# Patient Record
Sex: FEMALE | URBAN - NONMETROPOLITAN AREA
[De-identification: names, ages, dates, MRNs, and addresses within clinical notes are randomized per-mention and may not be internally consistent; named-entity substitution may affect disease eponyms.]

---

## 2017-07-26 ENCOUNTER — DOCTOR'S OFFICE (OUTPATIENT)
Dept: URBAN - NONMETROPOLITAN AREA CLINIC 1 | Facility: CLINIC | Age: 57
Setting detail: OPHTHALMOLOGY
End: 2017-07-26
Payer: COMMERCIAL

## 2017-07-26 DIAGNOSIS — H52.4: ICD-10-CM

## 2017-07-26 PROCEDURE — 92015 DETERMINE REFRACTIVE STATE: CPT | Performed by: OPTOMETRIST

## 2017-07-27 ENCOUNTER — DOCTOR'S OFFICE (OUTPATIENT)
Dept: URBAN - NONMETROPOLITAN AREA CLINIC 1 | Facility: CLINIC | Age: 57
Setting detail: OPHTHALMOLOGY
End: 2017-07-27
Payer: COMMERCIAL

## 2017-07-27 DIAGNOSIS — H53.021: ICD-10-CM

## 2017-07-27 DIAGNOSIS — H52.03: ICD-10-CM

## 2017-07-27 DIAGNOSIS — H52.4: ICD-10-CM

## 2017-07-27 DIAGNOSIS — H25.13: ICD-10-CM

## 2017-07-27 PROCEDURE — 92004 COMPRE OPH EXAM NEW PT 1/>: CPT | Performed by: OPTOMETRIST

## 2017-07-27 PROCEDURE — 92015 DETERMINE REFRACTIVE STATE: CPT | Performed by: OPTOMETRIST

## 2017-07-27 ASSESSMENT — REFRACTION_OUTSIDERX
OS_CYLINDER: -0.75
OS_AXIS: 090
OD_CYLINDER: -0.75
OS_VA2: 20/25
OS_SPHERE: +0.50
OD_AXIS: 175
OD_ADD: +2.50
OD_VA3: 20/
OS_ADD: +2.50
OD_VA2: 20/30
OD_SPHERE: +1.00
OD_VA1: 20/30NI
OU_VA: 20/25
OS_VA3: 20/
OS_VA1: 20/25NI

## 2017-07-27 ASSESSMENT — REFRACTION_MANIFEST
OD_VA2: 20/
OD_VA1: 20/
OS_VA2: 20/
OS_VA2: 20/
OD_VA3: 20/
OD_VA2: 20/
OS_VA1: 20/
OS_VA3: 20/
OU_VA: 20/
OU_VA: 20/
OD_VA3: 20/
OS_VA1: 20/
OD_VA1: 20/
OS_VA3: 20/

## 2017-07-27 ASSESSMENT — REFRACTION_AUTOREFRACTION
OD_CYLINDER: -3.00
OS_CYLINDER: -3.25
OD_AXIS: 173
OD_SPHERE: +1.00
OS_SPHERE: +0.50
OS_AXIS: 096

## 2017-07-27 ASSESSMENT — CONFRONTATIONAL VISUAL FIELD TEST (CVF)
OD_FINDINGS: FULL
OS_FINDINGS: FULL

## 2017-07-27 ASSESSMENT — REFRACTION_CURRENTRX
OS_OVR_VA: 20/
OD_OVR_VA: 20/
OD_OVR_VA: 20/
OS_OVR_VA: 20/
OS_OVR_VA: 20/
OD_OVR_VA: 20/

## 2017-07-27 ASSESSMENT — SPHEQUIV_DERIVED
OS_SPHEQUIV: -1.125
OD_SPHEQUIV: -0.5

## 2017-07-27 ASSESSMENT — VISUAL ACUITY
OS_BCVA: 20/50+2
OD_BCVA: 20/30+1

## 2020-11-27 ENCOUNTER — APPOINTMENT (EMERGENCY)
Dept: RADIOLOGY | Facility: HOSPITAL | Age: 60
End: 2020-11-27
Payer: MEDICARE

## 2020-11-27 ENCOUNTER — HOSPITAL ENCOUNTER (EMERGENCY)
Facility: HOSPITAL | Age: 60
Discharge: HOME | End: 2020-11-27
Attending: EMERGENCY MEDICINE
Payer: MEDICARE

## 2020-11-27 ENCOUNTER — APPOINTMENT (EMERGENCY)
Dept: CT IMAGING | Facility: HOSPITAL | Age: 60
End: 2020-11-27
Payer: MEDICARE

## 2020-11-27 VITALS
TEMPERATURE: 98.2 F | DIASTOLIC BLOOD PRESSURE: 68 MMHG | BODY MASS INDEX: 27.6 KG/M2 | SYSTOLIC BLOOD PRESSURE: 143 MMHG | RESPIRATION RATE: 20 BRPM | OXYGEN SATURATION: 95 % | HEIGHT: 66 IN | HEART RATE: 87 BPM | WEIGHT: 171.74 LBS

## 2020-11-27 DIAGNOSIS — J44.1 COPD EXACERBATION (HCC): ICD-10-CM

## 2020-11-27 DIAGNOSIS — K86.89 PANCREATIC MASS: Primary | ICD-10-CM

## 2020-11-27 LAB
ALBUMIN SERPL BCP-MCNC: 3.1 G/DL (ref 3.5–5)
ALP SERPL-CCNC: 99 U/L (ref 46–116)
ALT SERPL W P-5'-P-CCNC: 17 U/L (ref 12–78)
ANION GAP SERPL CALCULATED.3IONS-SCNC: 5 MMOL/L (ref 4–13)
AST SERPL W P-5'-P-CCNC: 12 U/L (ref 5–45)
BASOPHILS # BLD AUTO: 0.09 THOUSANDS/ΜL (ref 0–0.1)
BASOPHILS NFR BLD AUTO: 1 % (ref 0–1)
BILIRUB SERPL-MCNC: 0.7 MG/DL (ref 0.2–1)
BUN SERPL-MCNC: 5 MG/DL (ref 5–25)
CALCIUM ALBUM COR SERPL-MCNC: 9.8 MG/DL (ref 8.3–10.1)
CALCIUM SERPL-MCNC: 9.1 MG/DL (ref 8.3–10.1)
CHLORIDE SERPL-SCNC: 104 MMOL/L (ref 100–108)
CO2 SERPL-SCNC: 31 MMOL/L (ref 21–32)
CREAT SERPL-MCNC: 0.76 MG/DL (ref 0.6–1.3)
D DIMER PPP FEU-MCNC: 0.73 UG/ML FEU
EOSINOPHIL # BLD AUTO: 0.11 THOUSAND/ΜL (ref 0–0.61)
EOSINOPHIL NFR BLD AUTO: 1 % (ref 0–6)
ERYTHROCYTE [DISTWIDTH] IN BLOOD BY AUTOMATED COUNT: 13 % (ref 11.6–15.1)
GFR SERPL CREATININE-BSD FRML MDRD: 86 ML/MIN/1.73SQ M
GLUCOSE SERPL-MCNC: 101 MG/DL (ref 65–140)
HCT VFR BLD AUTO: 47.3 % (ref 34.8–46.1)
HGB BLD-MCNC: 15.9 G/DL (ref 11.5–15.4)
IMM GRANULOCYTES # BLD AUTO: 0.03 THOUSAND/UL (ref 0–0.2)
IMM GRANULOCYTES NFR BLD AUTO: 0 % (ref 0–2)
LYMPHOCYTES # BLD AUTO: 1.93 THOUSANDS/ΜL (ref 0.6–4.47)
LYMPHOCYTES NFR BLD AUTO: 19 % (ref 14–44)
MCH RBC QN AUTO: 30.7 PG (ref 26.8–34.3)
MCHC RBC AUTO-ENTMCNC: 33.6 G/DL (ref 31.4–37.4)
MCV RBC AUTO: 91 FL (ref 82–98)
MONOCYTES # BLD AUTO: 0.74 THOUSAND/ΜL (ref 0.17–1.22)
MONOCYTES NFR BLD AUTO: 7 % (ref 4–12)
NEUTROPHILS # BLD AUTO: 7.22 THOUSANDS/ΜL (ref 1.85–7.62)
NEUTS SEG NFR BLD AUTO: 72 % (ref 43–75)
NRBC BLD AUTO-RTO: 0 /100 WBCS
NT-PROBNP SERPL-MCNC: 339 PG/ML
PLATELET # BLD AUTO: 310 THOUSANDS/UL (ref 149–390)
PMV BLD AUTO: 10 FL (ref 8.9–12.7)
POTASSIUM SERPL-SCNC: 3 MMOL/L (ref 3.5–5.3)
PROT SERPL-MCNC: 7 G/DL (ref 6.4–8.2)
RBC # BLD AUTO: 5.18 MILLION/UL (ref 3.81–5.12)
SODIUM SERPL-SCNC: 140 MMOL/L (ref 136–145)
TROPONIN I SERPL-MCNC: <0.02 NG/ML
WBC # BLD AUTO: 10.12 THOUSAND/UL (ref 4.31–10.16)

## 2020-11-27 PROCEDURE — 85025 COMPLETE CBC W/AUTO DIFF WBC: CPT | Performed by: EMERGENCY MEDICINE

## 2020-11-27 PROCEDURE — 83880 ASSAY OF NATRIURETIC PEPTIDE: CPT | Performed by: EMERGENCY MEDICINE

## 2020-11-27 PROCEDURE — 85379 FIBRIN DEGRADATION QUANT: CPT | Performed by: EMERGENCY MEDICINE

## 2020-11-27 PROCEDURE — 71045 X-RAY EXAM CHEST 1 VIEW: CPT

## 2020-11-27 PROCEDURE — 93005 ELECTROCARDIOGRAM TRACING: CPT

## 2020-11-27 PROCEDURE — 36415 COLL VENOUS BLD VENIPUNCTURE: CPT | Performed by: EMERGENCY MEDICINE

## 2020-11-27 PROCEDURE — 94640 AIRWAY INHALATION TREATMENT: CPT

## 2020-11-27 PROCEDURE — 99285 EMERGENCY DEPT VISIT HI MDM: CPT

## 2020-11-27 PROCEDURE — 80053 COMPREHEN METABOLIC PANEL: CPT | Performed by: EMERGENCY MEDICINE

## 2020-11-27 PROCEDURE — G1004 CDSM NDSC: HCPCS

## 2020-11-27 PROCEDURE — 99285 EMERGENCY DEPT VISIT HI MDM: CPT | Performed by: EMERGENCY MEDICINE

## 2020-11-27 PROCEDURE — 71275 CT ANGIOGRAPHY CHEST: CPT

## 2020-11-27 PROCEDURE — 84484 ASSAY OF TROPONIN QUANT: CPT | Performed by: EMERGENCY MEDICINE

## 2020-11-27 RX ORDER — ALBUTEROL SULFATE 2.5 MG/3ML
2.5 SOLUTION RESPIRATORY (INHALATION) EVERY 6 HOURS PRN
Qty: 75 ML | Refills: 0 | Status: SHIPPED | OUTPATIENT
Start: 2020-11-27

## 2020-11-27 RX ORDER — ALBUTEROL SULFATE 2.5 MG/3ML
2.5 SOLUTION RESPIRATORY (INHALATION) ONCE
Status: COMPLETED | OUTPATIENT
Start: 2020-11-27 | End: 2020-11-27

## 2020-11-27 RX ADMIN — ALBUTEROL SULFATE 2.5 MG: 2.5 SOLUTION RESPIRATORY (INHALATION) at 15:22

## 2020-11-27 RX ADMIN — IOHEXOL 85 ML: 350 INJECTION, SOLUTION INTRAVENOUS at 16:13

## 2020-11-29 LAB
ATRIAL RATE: 88 BPM
P AXIS: 77 DEGREES
PR INTERVAL: 150 MS
QRS AXIS: 75 DEGREES
QRSD INTERVAL: 94 MS
QT INTERVAL: 378 MS
QTC INTERVAL: 457 MS
T WAVE AXIS: 82 DEGREES
VENTRICULAR RATE: 88 BPM

## 2020-11-29 PROCEDURE — 93010 ELECTROCARDIOGRAM REPORT: CPT | Performed by: INTERNAL MEDICINE

## 2020-12-01 ENCOUNTER — HOSPITAL ENCOUNTER (OUTPATIENT)
Dept: CT IMAGING | Facility: HOSPITAL | Age: 60
Discharge: HOME | End: 2020-12-01
Attending: EMERGENCY MEDICINE
Payer: MEDICARE

## 2020-12-01 DIAGNOSIS — K86.89 PANCREATIC MASS: ICD-10-CM

## 2020-12-01 PROCEDURE — 74177 CT ABD & PELVIS W/CONTRAST: CPT

## 2020-12-01 PROCEDURE — G1004 CDSM NDSC: HCPCS

## 2020-12-01 RX ADMIN — IOHEXOL 100 ML: 350 INJECTION, SOLUTION INTRAVENOUS at 11:02

## 2023-06-09 ENCOUNTER — DOCTOR'S OFFICE (OUTPATIENT)
Dept: URBAN - NONMETROPOLITAN AREA CLINIC 1 | Facility: CLINIC | Age: 63
Setting detail: OPHTHALMOLOGY
End: 2023-06-09
Payer: MEDICARE

## 2023-06-09 VITALS — HEIGHT: 55 IN

## 2023-06-09 DIAGNOSIS — H43.812: ICD-10-CM

## 2023-06-09 DIAGNOSIS — H35.3132: ICD-10-CM

## 2023-06-09 DIAGNOSIS — H04.123: ICD-10-CM

## 2023-06-09 DIAGNOSIS — H25.13: ICD-10-CM

## 2023-06-09 PROCEDURE — 92004 COMPRE OPH EXAM NEW PT 1/>: CPT | Performed by: OPHTHALMOLOGY

## 2023-06-09 PROCEDURE — 92134 CPTRZ OPH DX IMG PST SGM RTA: CPT | Performed by: OPHTHALMOLOGY

## 2023-06-09 ASSESSMENT — REFRACTION_MANIFEST
OS_ADD: +2.50
OD_ADD: +2.50
OD_CYLINDER: -0.75
OS_VA2: 20/25
OU_VA: 20/25
OD_VA1: 20/30NI
OD_SPHERE: +1.00
OS_SPHERE: +0.50
OD_VA2: 20/30
OS_AXIS: 090
OD_AXIS: 175
OS_CYLINDER: -0.75
OS_VA1: 20/25NI

## 2023-06-09 ASSESSMENT — REFRACTION_AUTOREFRACTION
OD_SPHERE: +0.25
OD_CYLINDER: -0.50
OS_SPHERE: +1.25
OD_AXIS: 051
OS_AXIS: 071
OS_CYLINDER: -1.00

## 2023-06-09 ASSESSMENT — AXIALLENGTH_DERIVED
OS_AL: 22.7778
OD_AL: 22.9662
OD_AL: 22.7377
OS_AL: 23.0071

## 2023-06-09 ASSESSMENT — VISUAL ACUITY
OD_BCVA: 20/50-2
OS_BCVA: 20/40-2

## 2023-06-09 ASSESSMENT — KERATOMETRY
OS_AXISANGLE_DEGREES: 122
OD_AXISANGLE_DEGREES: 081
OS_K1POWER_DIOPTERS: 44.75
OD_K2POWER_DIOPTERS: 45.50
OD_K1POWER_DIOPTERS: 45.00
OS_K2POWER_DIOPTERS: 45.25

## 2023-06-09 ASSESSMENT — SPHEQUIV_DERIVED
OD_SPHEQUIV: 0
OS_SPHEQUIV: 0.75
OD_SPHEQUIV: 0.625
OS_SPHEQUIV: 0.125

## 2023-06-09 ASSESSMENT — DRY EYES - PHYSICIAN NOTES
OD_GENERALCOMMENTS: 2+ PEE
OS_GENERALCOMMENTS: 2+ PEE

## 2023-06-09 ASSESSMENT — REFRACTION_CURRENTRX
OD_OVR_VA: 20/
OS_OVR_VA: 20/

## 2023-06-09 ASSESSMENT — CONFRONTATIONAL VISUAL FIELD TEST (CVF)
OS_FINDINGS: FULL
OD_FINDINGS: FULL

## 2023-06-19 ENCOUNTER — DOCTOR'S OFFICE (OUTPATIENT)
Dept: URBAN - NONMETROPOLITAN AREA CLINIC 1 | Facility: CLINIC | Age: 63
Setting detail: OPHTHALMOLOGY
End: 2023-06-19
Payer: MEDICARE

## 2023-06-19 DIAGNOSIS — H04.123: ICD-10-CM

## 2023-06-19 DIAGNOSIS — H35.3132: ICD-10-CM

## 2023-06-19 DIAGNOSIS — H43.812: ICD-10-CM

## 2023-06-19 DIAGNOSIS — H25.13: ICD-10-CM

## 2023-06-19 PROCEDURE — 99214 OFFICE O/P EST MOD 30 MIN: CPT | Performed by: OPHTHALMOLOGY

## 2023-06-19 ASSESSMENT — DRY EYES - PHYSICIAN NOTES
OS_GENERALCOMMENTS: 2+ PEE
OD_GENERALCOMMENTS: 2+ PEE

## 2023-06-19 ASSESSMENT — REFRACTION_MANIFEST
OD_VA2: 20/30
OS_VA2: 20/25
OD_VA1: 20/30NI
OS_SPHERE: +0.50
OD_SPHERE: +1.00
OU_VA: 20/25
OS_CYLINDER: -0.75
OD_CYLINDER: -0.75
OS_AXIS: 090
OS_ADD: +2.50
OS_VA1: 20/25NI
OD_ADD: +2.50
OD_AXIS: 175

## 2023-06-19 ASSESSMENT — REFRACTION_AUTOREFRACTION
OD_AXIS: 051
OS_SPHERE: +1.25
OS_AXIS: 071
OD_CYLINDER: -0.50
OS_CYLINDER: -1.00
OD_SPHERE: +0.25

## 2023-06-19 ASSESSMENT — REFRACTION_CURRENTRX
OD_OVR_VA: 20/
OS_OVR_VA: 20/

## 2023-06-19 ASSESSMENT — CONFRONTATIONAL VISUAL FIELD TEST (CVF)
OS_FINDINGS: FULL
OD_FINDINGS: FULL

## 2023-06-19 ASSESSMENT — SPHEQUIV_DERIVED
OS_SPHEQUIV: 0.75
OD_SPHEQUIV: 0.625
OS_SPHEQUIV: 0.125
OD_SPHEQUIV: 0

## 2023-06-19 ASSESSMENT — VISUAL ACUITY
OD_BCVA: 20/50-2
OS_BCVA: 20/50-2

## 2023-06-19 ASSESSMENT — KERATOMETRY
OS_K1POWER_DIOPTERS: 44.75
OS_AXISANGLE_DEGREES: 122
OD_AXISANGLE_DEGREES: 081
OD_K2POWER_DIOPTERS: 45.50
OD_K1POWER_DIOPTERS: 45.00
OS_K2POWER_DIOPTERS: 45.25

## 2023-06-19 ASSESSMENT — AXIALLENGTH_DERIVED
OD_AL: 22.7377
OS_AL: 23.0071
OS_AL: 22.7778
OD_AL: 22.9662

## 2023-07-13 ENCOUNTER — DOCTOR'S OFFICE (OUTPATIENT)
Dept: URBAN - NONMETROPOLITAN AREA CLINIC 1 | Facility: CLINIC | Age: 63
Setting detail: OPHTHALMOLOGY
End: 2023-07-13
Payer: MEDICARE

## 2023-07-13 DIAGNOSIS — H25.12: ICD-10-CM

## 2023-07-13 PROCEDURE — 92136 OPHTHALMIC BIOMETRY: CPT | Performed by: OPHTHALMOLOGY

## 2023-07-17 ENCOUNTER — DOCTOR'S OFFICE (OUTPATIENT)
Dept: URBAN - NONMETROPOLITAN AREA CLINIC 1 | Facility: CLINIC | Age: 63
Setting detail: OPHTHALMOLOGY
End: 2023-07-17
Payer: MEDICARE

## 2023-07-17 DIAGNOSIS — H25.13: ICD-10-CM

## 2023-07-17 DIAGNOSIS — H35.3132: ICD-10-CM

## 2023-07-17 DIAGNOSIS — H43.812: ICD-10-CM

## 2023-07-17 DIAGNOSIS — H04.123: ICD-10-CM

## 2023-07-17 PROCEDURE — 92235 FLUORESCEIN ANGRPH MLTIFRAME: CPT | Performed by: OPHTHALMOLOGY

## 2023-07-17 PROCEDURE — 99213 OFFICE O/P EST LOW 20 MIN: CPT | Performed by: OPHTHALMOLOGY

## 2023-07-17 PROCEDURE — 92250 FUNDUS PHOTOGRAPHY W/I&R: CPT | Performed by: OPHTHALMOLOGY

## 2023-07-17 ASSESSMENT — DRY EYES - PHYSICIAN NOTES
OS_GENERALCOMMENTS: 2+ PEE
OD_GENERALCOMMENTS: 2+ PEE

## 2023-07-17 ASSESSMENT — CONFRONTATIONAL VISUAL FIELD TEST (CVF): OD_FINDINGS: FULL

## 2023-07-18 ASSESSMENT — REFRACTION_MANIFEST
OS_ADD: +2.50
OD_ADD: +2.50
OU_VA: 20/25
OS_VA2: 20/25
OS_SPHERE: +0.50
OS_AXIS: 090
OD_VA1: 20/30NI
OS_CYLINDER: -0.75
OD_CYLINDER: -0.75
OS_VA1: 20/25NI
OD_SPHERE: +1.00
OD_AXIS: 175
OD_VA2: 20/30

## 2023-07-18 ASSESSMENT — REFRACTION_AUTOREFRACTION
OD_CYLINDER: -0.50
OD_AXIS: 051
OS_AXIS: 071
OS_CYLINDER: -1.00
OD_SPHERE: +0.25
OS_SPHERE: +1.25

## 2023-07-18 ASSESSMENT — KERATOMETRY
OD_K2POWER_DIOPTERS: 45.50
OD_AXISANGLE_DEGREES: 081
OD_K1POWER_DIOPTERS: 45.00
OS_K2POWER_DIOPTERS: 45.25
OS_AXISANGLE_DEGREES: 122
OS_K1POWER_DIOPTERS: 44.75

## 2023-07-18 ASSESSMENT — AXIALLENGTH_DERIVED
OD_AL: 22.7377
OS_AL: 23.0071
OD_AL: 22.9662
OS_AL: 22.7778

## 2023-07-18 ASSESSMENT — VISUAL ACUITY
OD_BCVA: 20/40+2
OS_BCVA: 20/50+2

## 2023-07-18 ASSESSMENT — SPHEQUIV_DERIVED
OD_SPHEQUIV: 0.625
OS_SPHEQUIV: 0.125
OD_SPHEQUIV: 0
OS_SPHEQUIV: 0.75

## 2023-07-18 ASSESSMENT — REFRACTION_CURRENTRX
OD_OVR_VA: 20/
OS_OVR_VA: 20/

## 2023-07-24 ENCOUNTER — AMBUL SURGICAL CARE (OUTPATIENT)
Dept: URBAN - NONMETROPOLITAN AREA SURGERY 1 | Facility: SURGERY | Age: 63
Setting detail: OPHTHALMOLOGY
End: 2023-07-24
Payer: MEDICARE

## 2023-07-24 DIAGNOSIS — H25.12: ICD-10-CM

## 2023-07-24 PROCEDURE — G8907 PT DOC NO EVENTS ON DISCHARG: HCPCS | Performed by: OPHTHALMOLOGY

## 2023-07-24 PROCEDURE — 66984 XCAPSL CTRC RMVL W/O ECP: CPT | Performed by: OPHTHALMOLOGY

## 2023-07-24 PROCEDURE — G8918 PT W/O PREOP ORDER IV AB PRO: HCPCS | Performed by: OPHTHALMOLOGY

## 2023-07-25 ENCOUNTER — DOCTOR'S OFFICE (OUTPATIENT)
Dept: URBAN - NONMETROPOLITAN AREA CLINIC 1 | Facility: CLINIC | Age: 63
Setting detail: OPHTHALMOLOGY
End: 2023-07-25
Payer: MEDICARE

## 2023-07-25 ENCOUNTER — RX ONLY (RX ONLY)
Age: 63
End: 2023-07-25

## 2023-07-25 DIAGNOSIS — H25.11: ICD-10-CM

## 2023-07-25 DIAGNOSIS — Z96.1: ICD-10-CM

## 2023-07-25 PROCEDURE — 92136 OPHTHALMIC BIOMETRY: CPT | Performed by: OPHTHALMOLOGY

## 2023-07-25 PROCEDURE — 99024 POSTOP FOLLOW-UP VISIT: CPT | Performed by: OPHTHALMOLOGY

## 2023-07-25 ASSESSMENT — AXIALLENGTH_DERIVED
OD_AL: 22.8233
OS_AL: 23.0948
OD_AL: 22.9609
OS_AL: 22.9096

## 2023-07-25 ASSESSMENT — REFRACTION_MANIFEST
OS_AXIS: 090
OD_CYLINDER: -0.75
OS_VA2: 20/25
OD_VA2: 20/30
OS_SPHERE: +0.50
OD_VA1: 20/30NI
OD_ADD: +2.50
OD_SPHERE: +1.00
OD_AXIS: 175
OS_ADD: +2.50
OU_VA: 20/25
OS_VA1: 20/25NI
OS_CYLINDER: -0.75

## 2023-07-25 ASSESSMENT — SPHEQUIV_DERIVED
OS_SPHEQUIV: 0.625
OS_SPHEQUIV: 0.125
OD_SPHEQUIV: 0.625
OD_SPHEQUIV: 0.25

## 2023-07-25 ASSESSMENT — REFRACTION_AUTOREFRACTION
OS_CYLINDER: -0.75
OD_CYLINDER: 0.00
OS_SPHERE: +1.00
OD_SPHERE: +0.25
OS_AXIS: 059

## 2023-07-25 ASSESSMENT — KERATOMETRY
OS_AXISANGLE_DEGREES: 123
OS_K1POWER_DIOPTERS: 44.50
OD_AXISANGLE_DEGREES: 078
OS_K2POWER_DIOPTERS: 45.00
OD_K1POWER_DIOPTERS: 44.50
OD_K2POWER_DIOPTERS: 45.50

## 2023-07-25 ASSESSMENT — REFRACTION_CURRENTRX
OS_OVR_VA: 20/
OD_OVR_VA: 20/

## 2023-07-25 ASSESSMENT — VISUAL ACUITY
OD_BCVA: 20/50-2
OS_BCVA: 20/40-2

## 2023-07-25 ASSESSMENT — DRY EYES - PHYSICIAN NOTES: OD_GENERALCOMMENTS: 2+ PEE

## 2023-08-01 ENCOUNTER — DOCTOR'S OFFICE (OUTPATIENT)
Dept: URBAN - NONMETROPOLITAN AREA CLINIC 1 | Facility: CLINIC | Age: 63
Setting detail: OPHTHALMOLOGY
End: 2023-08-01
Payer: MEDICARE

## 2023-08-01 DIAGNOSIS — Z96.1: ICD-10-CM

## 2023-08-01 DIAGNOSIS — H25.11: ICD-10-CM

## 2023-08-01 PROCEDURE — 99024 POSTOP FOLLOW-UP VISIT: CPT | Performed by: OPHTHALMOLOGY

## 2023-08-01 ASSESSMENT — REFRACTION_MANIFEST
OS_VA1: 20/25NI
OD_VA2: 20/30
OD_AXIS: 175
OS_CYLINDER: -0.75
OD_SPHERE: +1.00
OS_AXIS: 090
OU_VA: 20/25
OD_CYLINDER: -0.75
OD_VA1: 20/30NI
OS_ADD: +2.50
OS_SPHERE: +0.50
OS_VA2: 20/25
OD_ADD: +2.50

## 2023-08-01 ASSESSMENT — KERATOMETRY
OD_K2POWER_DIOPTERS: 45.00
OS_K1POWER_DIOPTERS: 45.00
OD_K1POWER_DIOPTERS: 44.75
OS_AXISANGLE_DEGREES: 121
OD_AXISANGLE_DEGREES: 95
OS_K2POWER_DIOPTERS: 46.25

## 2023-08-01 ASSESSMENT — DRY EYES - PHYSICIAN NOTES
OD_GENERALCOMMENTS: 2+ PEE
OS_GENERALCOMMENTS: T PEE

## 2023-08-01 ASSESSMENT — REFRACTION_CURRENTRX
OD_OVR_VA: 20/
OS_OVR_VA: 20/

## 2023-08-01 ASSESSMENT — VISUAL ACUITY
OD_BCVA: 20/30-2
OS_BCVA: 20/40-2

## 2023-08-01 ASSESSMENT — REFRACTION_AUTOREFRACTION
OD_AXIS: 87
OD_SPHERE: +1.25
OD_CYLINDER: -1.25

## 2023-08-01 ASSESSMENT — SPHEQUIV_DERIVED
OD_SPHEQUIV: 0.625
OS_SPHEQUIV: 0.125
OD_SPHEQUIV: 0.625

## 2023-08-01 ASSESSMENT — AXIALLENGTH_DERIVED
OS_AL: 22.7908
OD_AL: 22.8664
OD_AL: 22.8664

## 2023-08-08 ENCOUNTER — AMBUL SURGICAL CARE (OUTPATIENT)
Dept: URBAN - NONMETROPOLITAN AREA SURGERY 1 | Facility: SURGERY | Age: 63
Setting detail: OPHTHALMOLOGY
End: 2023-08-08
Payer: MEDICARE

## 2023-08-08 DIAGNOSIS — H25.11: ICD-10-CM

## 2023-08-08 DIAGNOSIS — H25.011: ICD-10-CM

## 2023-08-08 PROCEDURE — G8918 PT W/O PREOP ORDER IV AB PRO: HCPCS | Performed by: OPHTHALMOLOGY

## 2023-08-08 PROCEDURE — 66984 XCAPSL CTRC RMVL W/O ECP: CPT | Performed by: OPHTHALMOLOGY

## 2023-08-08 PROCEDURE — G8907 PT DOC NO EVENTS ON DISCHARG: HCPCS | Performed by: OPHTHALMOLOGY

## 2023-08-09 ENCOUNTER — DOCTOR'S OFFICE (OUTPATIENT)
Dept: URBAN - NONMETROPOLITAN AREA CLINIC 1 | Facility: CLINIC | Age: 63
Setting detail: OPHTHALMOLOGY
End: 2023-08-09
Payer: MEDICARE

## 2023-08-09 DIAGNOSIS — Z96.1: ICD-10-CM

## 2023-08-09 PROCEDURE — 99024 POSTOP FOLLOW-UP VISIT: CPT | Performed by: OPHTHALMOLOGY

## 2023-08-09 ASSESSMENT — REFRACTION_MANIFEST
OS_CYLINDER: -0.75
OD_SPHERE: +1.00
OS_ADD: +2.50
OU_VA: 20/25
OD_VA2: 20/30
OS_SPHERE: +0.50
OS_VA1: 20/25NI
OD_ADD: +2.50
OD_VA1: 20/30NI
OD_CYLINDER: -0.75
OS_AXIS: 090
OD_AXIS: 175
OS_VA2: 20/25

## 2023-08-09 ASSESSMENT — REFRACTION_CURRENTRX
OS_OVR_VA: 20/
OD_OVR_VA: 20/

## 2023-08-09 ASSESSMENT — KERATOMETRY
OD_AXISANGLE_DEGREES: 87
OD_K1POWER_DIOPTERS: 44.00
OD_K2POWER_DIOPTERS: 45.25
OS_K2POWER_DIOPTERS: 5.25
OS_AXISANGLE_DEGREES: 100
OS_K1POWER_DIOPTERS: 45.00

## 2023-08-09 ASSESSMENT — SPHEQUIV_DERIVED
OD_SPHEQUIV: 0.625
OS_SPHEQUIV: 0.375
OS_SPHEQUIV: 0.125
OD_SPHEQUIV: 0.625

## 2023-08-09 ASSESSMENT — VISUAL ACUITY
OS_BCVA: 20/40+2
OD_BCVA: 20/30-1

## 2023-08-09 ASSESSMENT — REFRACTION_AUTOREFRACTION
OD_SPHERE: +1.00
OD_CYLINDER: -0.75
OS_AXIS: 85
OS_SPHERE: +0.75
OS_CYLINDER: -0.75
OD_AXIS: 165

## 2023-08-09 ASSESSMENT — AXIALLENGTH_DERIVED
OS_AL: 32.95
OS_AL: 32.7616
OD_AL: 22.953
OD_AL: 22.953

## 2023-08-15 ENCOUNTER — DOCTOR'S OFFICE (OUTPATIENT)
Dept: URBAN - NONMETROPOLITAN AREA CLINIC 1 | Facility: CLINIC | Age: 63
Setting detail: OPHTHALMOLOGY
End: 2023-08-15
Payer: MEDICARE

## 2023-08-15 DIAGNOSIS — Z96.1: ICD-10-CM

## 2023-08-15 PROBLEM — H04.123: Status: ACTIVE | Noted: 2023-08-09

## 2023-08-15 PROCEDURE — 99024 POSTOP FOLLOW-UP VISIT: CPT | Performed by: OPHTHALMOLOGY

## 2023-08-15 ASSESSMENT — SPHEQUIV_DERIVED
OS_SPHEQUIV: 0.125
OD_SPHEQUIV: 0.625
OD_SPHEQUIV: 0.25
OS_SPHEQUIV: -0.375

## 2023-08-15 ASSESSMENT — KERATOMETRY
OD_AXISANGLE_DEGREES: 104
OD_K2POWER_DIOPTERS: 44.75
OS_K1POWER_DIOPTERS: 44.75
OS_K2POWER_DIOPTERS: 45.25
OS_AXISANGLE_DEGREES: 106
OD_K1POWER_DIOPTERS: 44.00

## 2023-08-15 ASSESSMENT — REFRACTION_MANIFEST
OD_CYLINDER: -0.75
OS_VA2: 20/25
OS_SPHERE: +0.50
OS_ADD: +2.50
OS_VA1: 20/25NI
OD_VA2: 20/30
OD_ADD: +2.50
OD_SPHERE: +1.00
OD_VA1: 20/30NI
OS_AXIS: 090
OU_VA: 20/25
OS_CYLINDER: -0.75
OD_AXIS: 175

## 2023-08-15 ASSESSMENT — AXIALLENGTH_DERIVED
OS_AL: 23.1939
OD_AL: 23.0402
OS_AL: 23.0071
OD_AL: 23.1804

## 2023-08-15 ASSESSMENT — REFRACTION_CURRENTRX
OD_OVR_VA: 20/
OS_OVR_VA: 20/

## 2023-08-15 ASSESSMENT — REFRACTION_AUTOREFRACTION
OS_CYLINDER: -1.75
OS_AXIS: 019
OS_SPHERE: +0.50
OD_CYLINDER: -0.50
OD_SPHERE: +0.50
OD_AXIS: 148

## 2023-08-15 ASSESSMENT — VISUAL ACUITY
OD_BCVA: 20/30-2
OS_BCVA: 20/25-2

## 2023-09-27 ENCOUNTER — OFFICE VISIT (OUTPATIENT)
Dept: URGENT CARE | Facility: CLINIC | Age: 63
End: 2023-09-27
Payer: COMMERCIAL

## 2023-09-27 VITALS
SYSTOLIC BLOOD PRESSURE: 135 MMHG | OXYGEN SATURATION: 98 % | WEIGHT: 154 LBS | TEMPERATURE: 98.1 F | HEART RATE: 82 BPM | DIASTOLIC BLOOD PRESSURE: 74 MMHG | RESPIRATION RATE: 20 BRPM | BODY MASS INDEX: 25.24 KG/M2

## 2023-09-27 DIAGNOSIS — Z71.1 PHYSICALLY WELL BUT WORRIED: Primary | ICD-10-CM

## 2023-09-27 PROCEDURE — 99203 OFFICE O/P NEW LOW 30 MIN: CPT

## 2023-09-27 RX ORDER — CENOBAMATE 100 MG/1
1 TABLET, FILM COATED ORAL
COMMUNITY
Start: 2023-09-20

## 2023-09-27 RX ORDER — CLONAZEPAM 0.5 MG/1
0.5 TABLET ORAL DAILY
COMMUNITY
Start: 2023-08-29

## 2023-09-27 RX ORDER — OXYCODONE HYDROCHLORIDE AND ACETAMINOPHEN 5; 325 MG/1; MG/1
1 TABLET ORAL EVERY 6 HOURS PRN
COMMUNITY

## 2023-09-27 RX ORDER — ROPINIROLE 5 MG/1
5 TABLET, FILM COATED ORAL 3 TIMES DAILY
COMMUNITY

## 2023-09-27 NOTE — PROGRESS NOTES
[facility]        NAME: Leeann Marie is a 59 y.o. female  : 1960    MRN: [id number]  DATE: 2023  TIME: 1:46 PM    Assessment and Plan   Physically well but worried [Z71.1]  1. Physically well but worried          Symptoms have fully resolved prior to evaluation. ER precautions advised. Patient Instructions     If symptoms return or worsen proceed to the ED. Stay hydrated. Follow up with PCP in 3-5 days. Proceed to  ER if symptoms worsen. Chief Complaint     Chief Complaint   Patient presents with   • dizzy and lightheaded         History of Present Illness       Patient is a 59year old female who presents to the office today for dizziness earlier today and light headedness. States that she got dizzy while driving and went to eat ezeep and since felt better. States she has been watching her grandchildren right now. Does have hx of seizures after smoking fake week 3 years ago but has not had one in 3 years. Dizziness felt like shew as walking sideways. Also recently had implants in her eyes that when she moves her eyes to quickly they cause a trail and dizziness. Review of Systems   Review of Systems   HENT: Negative for congestion. Respiratory: Negative for cough. Gastrointestinal: Negative for diarrhea, nausea and vomiting. Musculoskeletal: Negative for gait problem. Neurological: Positive for dizziness (resolved) and light-headedness (resolved). Negative for seizures and weakness. Psychiatric/Behavioral: Negative for agitation and confusion. All other systems reviewed and are negative.         Current Medications       Current Outpatient Medications:   •  albuterol (2.5 mg/3 mL) 0.083 % nebulizer solution, Take 1 vial (2.5 mg total) by nebulization every 6 (six) hours as needed for wheezing or shortness of breath, Disp: 75 mL, Rfl: 0  •  albuterol (5 mg/mL) 0.5 % nebulizer solution, Take 0.5 mL (2.5 mg total) by nebulization every 6 (six) hours as needed for wheezing, Disp: 20 mL, Rfl: 0  •  clonazePAM (KlonoPIN) 0.5 mg tablet, Take 0.5 mg by mouth daily, Disp: , Rfl:   •  oxyCODONE-acetaminophen (PERCOCET) 5-325 mg per tablet, Take 1 tablet by mouth every 6 (six) hours as needed for moderate pain, Disp: , Rfl:   •  rOPINIRole (REQUIP) 5 MG tablet, Take 5 mg by mouth 3 (three) times a day, Disp: , Rfl:   •  Xcopri 100 MG TABS, Take 1 tablet by mouth daily at bedtime, Disp: , Rfl:     Current Allergies     Allergies as of 09/27/2023   • (No Known Allergies)            The following portions of the patient's history were reviewed and updated as appropriate: allergies, current medications, past family history, past medical history, past social history, past surgical history and problem list.     Past Medical History:   Diagnosis Date   • COPD (chronic obstructive pulmonary disease) ([address])    • DJD (degenerative joint disease)    • Hardening of the arteries of the brain    • Restless leg syndrome        Past Surgical History:   Procedure Laterality Date   • APPENDECTOMY     • BLADDER NECK SUSPENSION     • CHOLECYSTECTOMY     • FRACTURE SURGERY Right     leg/ankle   • HYSTERECTOMY      partial       History reviewed. No pertinent family history. Medications have been verified. Objective   /74   Pulse 82   Temp 98.1 °F (36.7 °C)   Resp 20   Wt 69.9 kg (154 lb)   SpO2 98%   BMI 25.24 kg/m²        Physical Exam     Physical Exam  Vitals and nursing note reviewed. Constitutional:       Appearance: Normal appearance. She is normal weight. HENT:      Right Ear: Tympanic membrane is scarred. Left Ear: There is impacted cerumen. Mouth/Throat:      Pharynx: Posterior oropharyngeal erythema present. Comments: Posterior pharynx erythema with post nasal drip  Cardiovascular:      Rate and Rhythm: Normal rate and regular rhythm. Pulses: Normal pulses. Heart sounds: Normal heart sounds.    Pulmonary:      Effort: Pulmonary effort is normal.      Breath sounds: Normal breath sounds. Lymphadenopathy:      Cervical: No cervical adenopathy. Skin:     General: Skin is warm. Capillary Refill: Capillary refill takes less than 2 seconds. Neurological:      General: No focal deficit present. Mental Status: She is alert and oriented to person, place, and time. Mental status is at baseline. Cranial Nerves: No cranial nerve deficit. Sensory: No sensory deficit. Motor: No weakness. Coordination: Coordination normal.      Gait: Gait normal.      Deep Tendon Reflexes: Reflexes normal.   Psychiatric:         Mood and Affect: Mood normal.         Behavior: Behavior normal.         Thought Content:  Thought content normal.         Judgment: Judgment normal.

## 2023-12-03 ENCOUNTER — APPOINTMENT (EMERGENCY)
Dept: RADIOLOGY | Facility: HOSPITAL | Age: 63
End: 2023-12-03
Payer: COMMERCIAL

## 2023-12-03 ENCOUNTER — HOSPITAL ENCOUNTER (EMERGENCY)
Facility: HOSPITAL | Age: 63
Discharge: HOME | End: 2023-12-03
Attending: EMERGENCY MEDICINE | Admitting: EMERGENCY MEDICINE
Payer: COMMERCIAL

## 2023-12-03 VITALS
HEART RATE: 87 BPM | TEMPERATURE: 98.8 F | OXYGEN SATURATION: 100 % | DIASTOLIC BLOOD PRESSURE: 80 MMHG | WEIGHT: 165 LBS | SYSTOLIC BLOOD PRESSURE: 105 MMHG | BODY MASS INDEX: 27.04 KG/M2 | RESPIRATION RATE: 18 BRPM

## 2023-12-03 DIAGNOSIS — R07.81 RIB PAIN ON RIGHT SIDE: Primary | ICD-10-CM

## 2023-12-03 PROCEDURE — 71045 X-RAY EXAM CHEST 1 VIEW: CPT

## 2023-12-03 PROCEDURE — 99284 EMERGENCY DEPT VISIT MOD MDM: CPT | Performed by: PHYSICIAN ASSISTANT

## 2023-12-03 PROCEDURE — 99283 EMERGENCY DEPT VISIT LOW MDM: CPT

## 2023-12-03 RX ORDER — LIDOCAINE 50 MG/G
1 PATCH TOPICAL ONCE
Status: DISCONTINUED | OUTPATIENT
Start: 2023-12-03 | End: 2023-12-03 | Stop reason: HOSPADM

## 2023-12-03 RX ADMIN — LIDOCAINE 1 PATCH: 700 PATCH TOPICAL at 15:50

## 2023-12-03 NOTE — ED PROVIDER NOTES
History  Chief Complaint   Patient presents with    Rib Pain     Patient reports 2 days ago she was turning and coughed and heard a pop in her R rib area and has had R rib cage pain since. On percocet for chronic back pain and percocet is helping back, but no relief for ribs. 61-year-old female presents the emergency department for evaluation of right-sided rib pain. Patient states 2 days ago she had been leaning to the left and coughed very hard. States she heard a pop in her right ribs. Has had pain since. Reports at baseline she takes Percocet for back pain and has been taking this without any improvement of the pain in the right ribs. She states pain is worse with certain movements taking deep breath or coughing. She denies any direct trauma or injury. Reports she does have a history of COPD and currently smokes 1.5 packs/day. Prior to Admission Medications   Prescriptions Last Dose Informant Patient Reported? Taking?    Xcopri 100 MG TABS   Yes No   Sig: Take 1 tablet by mouth daily at bedtime   albuterol (2.5 mg/3 mL) 0.083 % nebulizer solution   No No   Sig: Take 1 vial (2.5 mg total) by nebulization every 6 (six) hours as needed for wheezing or shortness of breath   albuterol (5 mg/mL) 0.5 % nebulizer solution   No No   Sig: Take 0.5 mL (2.5 mg total) by nebulization every 6 (six) hours as needed for wheezing   clonazePAM (KlonoPIN) 0.5 mg tablet   Yes No   Sig: Take 0.5 mg by mouth daily   oxyCODONE-acetaminophen (PERCOCET) 5-325 mg per tablet   Yes No   Sig: Take 1 tablet by mouth every 6 (six) hours as needed for moderate pain   rOPINIRole (REQUIP) 5 MG tablet   Yes No   Sig: Take 5 mg by mouth 3 (three) times a day      Facility-Administered Medications: None       Past Medical History:   Diagnosis Date    COPD (chronic obstructive pulmonary disease) (HCC)     DJD (degenerative joint disease)     Hardening of the arteries of the brain     Restless leg syndrome        Past Surgical History:   Procedure Laterality Date    APPENDECTOMY      BLADDER NECK SUSPENSION      CHOLECYSTECTOMY      FRACTURE SURGERY Right     leg/ankle    HYSTERECTOMY      partial       History reviewed. No pertinent family history. I have reviewed and agree with the history as documented. E-Cigarette/Vaping    E-Cigarette Use Never User      E-Cigarette/Vaping Substances     Social History     Tobacco Use    Smoking status: Every Day     Packs/day: 1.50     Types: Cigarettes    Smokeless tobacco: Never   Vaping Use    Vaping Use: Never used   Substance Use Topics    Alcohol use: Not Currently    Drug use: Not Currently       Review of Systems   Constitutional: Negative. Negative for appetite change, fatigue and fever. Respiratory:  Negative for chest tightness and shortness of breath. Cardiovascular:  Negative for chest pain, palpitations and leg swelling. Gastrointestinal: Negative. Musculoskeletal:         Right rib pain   Skin: Negative. Neurological: Negative. All other systems reviewed and are negative. Physical Exam  Physical Exam  Vitals and nursing note reviewed. Constitutional:       General: She is not in acute distress. Appearance: Normal appearance. She is not ill-appearing, toxic-appearing or diaphoretic. HENT:      Head: Normocephalic. Eyes:      Conjunctiva/sclera: Conjunctivae normal.   Cardiovascular:      Rate and Rhythm: Normal rate and regular rhythm. Pulmonary:      Effort: Pulmonary effort is normal.      Breath sounds: Normal breath sounds. Decreased air movement present. No stridor. No wheezing, rhonchi or rales. Comments: Patient is taking shallow breaths  Chest:       Musculoskeletal:         General: Normal range of motion. Skin:     General: Skin is warm and dry. Findings: No bruising, erythema or rash. Neurological:      General: No focal deficit present. Mental Status: She is alert.          Vital Signs  ED Triage Vitals [12/03/23 1525] Temperature Pulse Respirations Blood Pressure SpO2   98.8 °F (37.1 °C) 87 18 105/80 100 %      Temp Source Heart Rate Source Patient Position - Orthostatic VS BP Location FiO2 (%)   Temporal -- Sitting Left arm --      Pain Score       10 - Worst Possible Pain           Vitals:    12/03/23 1525   BP: 105/80   Pulse: 87   Patient Position - Orthostatic VS: Sitting         Visual Acuity      ED Medications  Medications   lidocaine (LIDODERM) 5 % patch 1 patch (1 patch Topical Medication Applied 12/3/23 1550)       Diagnostic Studies  Results Reviewed       None                   XR chest 1 view   ED Interpretation by Deep Garcia PA-C (12/03 1616)   No acute cardiopulmonary findings                 Procedures  Procedures         ED Course  ED Course as of 12/03/23 1630   Sun Dec 03, 2023   1628 ED interpretation of x-rays negative for acute cardiopulmonary findings. No obvious osseous injury. I discussed these results and findings with the patient. She has Percocet at home. And lidocaine patches. Patient also has Tessalon Perles. She was given a incentive spirometer. She will follow-up with her PCP and return with new or worsening symptoms. SBIRT 22yo+      Flowsheet Row Most Recent Value   Initial Alcohol Screen: US AUDIT-C     1. How often do you have a drink containing alcohol? 0 Filed at: 12/03/2023 1525   2. How many drinks containing alcohol do you have on a typical day you are drinking? 0 Filed at: 12/03/2023 1525   3a. Male UNDER 65: How often do you have five or more drinks on one occasion? 0 Filed at: 12/03/2023 1525   3b. FEMALE Any Age, or MALE 65+: How often do you have 4 or more drinks on one occassion? 0 Filed at: 12/03/2023 1525   Audit-C Score 0 Filed at: 12/03/2023 1525   SHERLYN: How many times in the past year have you. .. Used an illegal drug or used a prescription medication for non-medical reasons?  Never Filed at: 12/03/2023 1525 Medical Decision Making  80-year-old female presented to the emergency department for evaluation of right rib pain status post coughing and hearing a pop 2 days ago. Vitals and medical record reviewed. Patient at risk for falling but not limited to fracture, pneumothorax, rib contusion, muscle strain. patient had reproducible tenderness on the right side of the ribs. ED interpretation of x-ray is negative for obvious rib fracture. There is no pneumothorax. She likely has a rib contusion or muscle strain. We discussed symptomatic treatment for such appropriate follow-up with PCP and return precautions and she verbalized understanding. Patient was educated on use of incentive spirometer and verbalized understanding. She was clinically and hemodynamically stable for discharge    Problems Addressed:  Rib pain on right side: acute illness or injury    Amount and/or Complexity of Data Reviewed  Radiology: ordered and independent interpretation performed. Risk  Prescription drug management. Disposition  Final diagnoses:   Rib pain on right side     Time reflects when diagnosis was documented in both MDM as applicable and the Disposition within this note       Time User Action Codes Description Comment    12/3/2023  4:18 PM Bill Solorzano Add [R07.81] Rib pain on right side           ED Disposition       ED Disposition   Discharge    Condition   Stable    Date/Time   Sun Dec 3, 2023 1133 [facility] discharge to home/self care. Follow-up Information       Follow up With Specialties Details Why [address], DO Emergency Medicine, Family Medicine In 3 days  [address] [place] [address]  [phone]              Discharge Medication List as of 12/3/2023  4:23 PM        CONTINUE these medications which have NOT CHANGED    Details   albuterol (2.5 mg/3 mL) 0.083 % nebulizer solution Take 1 vial (2.5 mg total) by nebulization every 6 (six) hours as needed for wheezing or shortness of breath, Starting Fri 11/27/2020, Normal      albuterol (5 mg/mL) 0.5 % nebulizer solution Take 0.5 mL (2.5 mg total) by nebulization every 6 (six) hours as needed for wheezing, Starting Fri 11/27/2020, Normal      clonazePAM (KlonoPIN) 0.5 mg tablet Take 0.5 mg by mouth daily, Starting Tue 8/29/2023, Historical Med      oxyCODONE-acetaminophen (PERCOCET) 5-325 mg per tablet Take 1 tablet by mouth every 6 (six) hours as needed for moderate pain, Historical Med      rOPINIRole (REQUIP) 5 MG tablet Take 5 mg by mouth 3 (three) times a day, Historical Med      Xcopri 100 MG TABS Take 1 tablet by mouth daily at bedtime, Starting Wed 9/20/2023, Historical Med             No discharge procedures on file.     PDMP Review       None            ED Provider  Electronically Signed by             Yee Daigle PA-C  12/03/23 5137

## 2023-12-03 NOTE — ED NOTES
Patient given incentive spirometry at this time & educated on usage.       Citlali Keene, [address]  12/03/23 5017

## 2023-12-03 NOTE — DISCHARGE INSTRUCTIONS
These follow-up with your family doctor. Continue with symptomatic treatment as we discussed, cough medication, avoid heavy lifting or twisting. Pain medications as needed. Please return with new or worsening symptoms  Your x-ray was reviewed today in the emergency department and there was no obvious abnormalities found, however, the imaging will be reviewed by the radiologist later this evening or the following day and if there is any abnormalities found you will get a phone call with those results.

## 2024-01-26 ENCOUNTER — DOCTOR'S OFFICE (OUTPATIENT)
Dept: URBAN - NONMETROPOLITAN AREA CLINIC 1 | Facility: CLINIC | Age: 64
Setting detail: OPHTHALMOLOGY
End: 2024-01-26
Payer: MEDICARE

## 2024-01-26 ENCOUNTER — RX ONLY (RX ONLY)
Age: 64
End: 2024-01-26

## 2024-01-26 DIAGNOSIS — H53.2: ICD-10-CM

## 2024-01-26 DIAGNOSIS — H04.123: ICD-10-CM

## 2024-01-26 PROCEDURE — 99213 OFFICE O/P EST LOW 20 MIN: CPT | Performed by: OPHTHALMOLOGY

## 2024-01-26 ASSESSMENT — REFRACTION_MANIFEST
OD_CYLINDER: -0.75
OS_ADD: +2.50
OD_ADD: +2.50
OD_VA1: 20/30NI
OD_AXIS: 175
OS_SPHERE: +0.50
OS_VA1: 20/25NI
OU_VA: 20/25
OD_VA2: 20/30
OS_AXIS: 090
OD_SPHERE: +1.00
OS_CYLINDER: -0.75
OS_VA2: 20/25

## 2024-01-26 ASSESSMENT — REFRACTION_AUTOREFRACTION
OD_AXIS: 061
OS_SPHERE: +0.25
OD_SPHERE: +0.25
OD_CYLINDER: -1.25
OS_AXIS: 061
OS_CYLINDER: -1.25

## 2024-01-26 ASSESSMENT — DRY EYES - PHYSICIAN NOTES
OS_GENERALCOMMENTS: TRACE PEE
OD_GENERALCOMMENTS: TRACE PEE

## 2024-01-26 ASSESSMENT — SPHEQUIV_DERIVED
OD_SPHEQUIV: 0.625
OS_SPHEQUIV: 0.125
OD_SPHEQUIV: -0.375
OS_SPHEQUIV: -0.375

## 2024-01-26 ASSESSMENT — REFRACTION_CURRENTRX
OD_OVR_VA: 20/
OS_OVR_VA: 20/

## 2024-01-26 ASSESSMENT — CONFRONTATIONAL VISUAL FIELD TEST (CVF)
OS_FINDINGS: FULL
OD_FINDINGS: FULL